# Patient Record
Sex: MALE | Race: WHITE | NOT HISPANIC OR LATINO | URBAN - METROPOLITAN AREA
[De-identification: names, ages, dates, MRNs, and addresses within clinical notes are randomized per-mention and may not be internally consistent; named-entity substitution may affect disease eponyms.]

---

## 2023-11-19 PROBLEM — F31.9 BIPOLAR 1 DISORDER (H): Status: ACTIVE | Noted: 2023-11-19

## 2023-11-19 NOTE — PROGRESS NOTES
"SUBJECTIVE:   Riaz is a 28 year old, presenting for the following:  Establish Care and Physical      Healthy Habits:     Getting at least 3 servings of Calcium per day:  Yes    Bi-annual eye exam:  Yes    Dental care twice a year:  NO    Sleep apnea or symptoms of sleep apnea:  Daytime drowsiness    Diet:  Regular (no restrictions)    Frequency of exercise:  4-5 days/week    Duration of exercise:  45-60 minutes    Taking medications regularly:  Yes    Medication side effects:  Lightheadedness    Additional concerns today:  No    - walks almost every day, 40-60 minutes at a time  - heart rate up a little with walks  - yoga twice a week      # Bipolar 1  # SHAHRZAD  - BP diagnosed 2019 in the setting of an acute psychotic episode requiring hospitalization  - recently mood symptoms have been worse, more \"chaotic\" recently  - has been working at lot on their [employer] recently, used the word \"manically\" to describe this  - job hunting    Psychiatry: Mary Nelson with [facility] (for years)  Therapy: Cathie Russell with [facility] (since early 2023)    Current Regimen  Buspirone 5mg AM and 10mg HS (started 2022)  Lithium CR 1125mg daily (started 2019)    Hydroxyzine 25mg PRN - rare and caused migraines         No data to display              # Palpitations  - sometimes at rest will feel that pulse is higher than it should be  - maybe once or twice a week  - can last a few hours  - feels \"tight\" in their chest and more anxious during these episodes  - they notice these other symptoms before the higher heart rate    - has felt his for a couple years, worse the past 2 years  - has no syncope history    # Nose Irritation  - right nostril  - a few months  - congested on that side    - had a sore spot just inside nostril for several weeks    - will have periods of very frequent sneezing  - used to happen infrequently  - now happening twice a week    # STI Screening    # HM  10/20/22 FChol 145, TGY 81, HDL 43, non-, LDL 86, VLDL " "16 @ [facility] [place] [facility]    Social History     Tobacco Use    Smoking status: Never    Smokeless tobacco: Never   Substance Use Topics    Alcohol use: Not Currently             11/20/2023    10:48 AM   Alcohol Use   Prescreen: >3 drinks/day or >7 drinks/week? Not Applicable     Last PSA: No results found for: \"PSA\"    Reviewed orders with patient. Reviewed health maintenance and updated orders accordingly - Yes    Reviewed and updated as needed this visit by clinical staff   Tobacco  Allergies  Meds   Med Hx  Surg Hx  Fam Hx  Soc Hx        Reviewed and updated as needed this visit by Provider   Tobacco  Allergies  Meds   Med Hx  Surg Hx  Fam Hx  Soc Hx         Review of Systems   Constitutional:  Negative for chills and fever.   HENT:  Positive for congestion. Negative for ear pain, hearing loss and sore throat.    Eyes:  Negative for pain and visual disturbance.   Respiratory:  Negative for cough and shortness of breath.    Cardiovascular:  Negative for chest pain, palpitations and peripheral edema.   Gastrointestinal:  Negative for abdominal pain, constipation, diarrhea, heartburn, hematochezia and nausea.   Genitourinary:  Positive for frequency. Negative for dysuria, genital sores, hematuria, impotence, penile discharge and urgency.   Musculoskeletal:  Positive for arthralgias and myalgias. Negative for joint swelling.   Skin:  Negative for rash.   Neurological:  Positive for headaches. Negative for dizziness, weakness and paresthesias.   Psychiatric/Behavioral:  Positive for mood changes. The patient is nervous/anxious.        OBJECTIVE:   BP (!) 149/93   Pulse 70   Temp 97.8  F (36.6  C)   Ht 1.796 m (5' 10.71\")   Wt 80.3 kg (177 lb 0.6 oz)   SpO2 98%   BMI 24.90 kg/m      Physical Exam  GENERAL: healthy, alert and no distress  EYES: Eyes grossly normal to inspection, PERRL and conjunctivae and sclerae normal  HENT: ear canals and TM's normal, nose and mouth without ulcers or lesions  NECK: no adenopathy, " no asymmetry, masses, or scars and thyroid normal to palpation  RESP: lungs clear to auscultation - no rales, rhonchi or wheezes  CV: regular rate and rhythm, normal S1 S2, no S3 or S4, no murmur, click or rub, no peripheral edema and peripheral pulses strong  ABDOMEN: soft, nontender, no hepatosplenomegaly, no masses and bowel sounds normal  MS: no gross musculoskeletal defects noted, no edema  SKIN: no suspicious lesions or rashes  NEURO: Normal strength and tone, mentation intact and speech normal  PSYCH: mentation appears normal, affect normal/bright         EKG Interpretation:      Symptoms at time of EKG: None   Rhythm: Normal sinus   Rate: Bradycardia, 49 bpm  Axis: Normal  Ectopy: None on final EKG, rare PVCs seen on EKG monitor  Conduction: Normal  ST Segments/ T Waves: ST Segement Elevation V1-V5 with asymmetric, prominent T waves and J point notching, consistent with benign early repolarization.   Q Waves: None  Comparison to prior: No old EKG available    Clinical Impression: Sinus Bradycardia      ASSESSMENT/PLAN:     (Z00.00) Annual physical exam  (primary encounter diagnosis)  Comment: Age appropriate screening and preventive services provided.   Plan: Last TDaP in 2017 in duplicate chart - will get charts merged.  Thinks they've had the Hep B vaccine - will talk with mom abut records.     (F31.9) Bipolar 1 disorder (H)  (F41.1) SHAHRZAD (generalized anxiety disorder)  Comment: Chronic, stable. [place] [facility]   Plan: TSH, T4 free, Basic metabolic panel    (R00.2) Palpitations  Comment: Chronic, stable.  Likely anxiety related but updating thyroid monitoring for lithium, hemoglobin, and electrolytes. EKG today normal.  If Patty develops any worsening symptoms (especially pre-syncopal or syncope) or more frequent symptoms, I asked them to reach out and we'd plan for ambulatory heart monitoring.   Plan: TSH, T4 free, Hemoglobin, Basic metabolic         panel, EKG 12-lead complete w/read -  Clinics          (Z11.3) Routine screening for STI (sexually transmitted infection)  Comment: Plan: Chlamydia trachomatis/Neisseria gonorrhoeae by         PCR, Treponema Abs w Reflex to RPR and Titer          (Z11.4) Screening for HIV (human immunodeficiency virus)  Comment: Plan: HIV Antigen Antibody Combo Cascade          (Z11.59) Encounter for hepatitis C screening test for low risk patient  Comment: Plan: Hepatitis C Screen Reflex to HCV RNA Quant and         Genotype          (R09.81) Chronic nasal congestion  Comment: Chronic, stable.  Trial of nasal fluticasone.  If not improving, will refer to ENT.  Plan: fluticasone (FLONASE) 50 MCG/ACT nasal spray            COUNSELING:   Reviewed preventive health counseling, as reflected in patient instructions      He reports that Patty Lamas has never smoked. Patty Lamas has never used smokeless tobacco.      MD JUNG Felton [facility]

## 2023-11-20 ENCOUNTER — DOCUMENTATION ONLY (OUTPATIENT)
Dept: FAMILY MEDICINE | Facility: CLINIC | Age: 28
End: 2023-11-20

## 2023-11-20 ENCOUNTER — OFFICE VISIT (OUTPATIENT)
Dept: FAMILY MEDICINE | Facility: CLINIC | Age: 28
End: 2023-11-20
Payer: COMMERCIAL

## 2023-11-20 VITALS
SYSTOLIC BLOOD PRESSURE: 149 MMHG | OXYGEN SATURATION: 98 % | WEIGHT: 177.04 LBS | BODY MASS INDEX: 24.78 KG/M2 | TEMPERATURE: 97.8 F | DIASTOLIC BLOOD PRESSURE: 93 MMHG | HEART RATE: 70 BPM | HEIGHT: 71 IN

## 2023-11-20 DIAGNOSIS — F41.1 GAD (GENERALIZED ANXIETY DISORDER): ICD-10-CM

## 2023-11-20 DIAGNOSIS — Z11.4 SCREENING FOR HIV (HUMAN IMMUNODEFICIENCY VIRUS): ICD-10-CM

## 2023-11-20 DIAGNOSIS — Z00.00 ANNUAL PHYSICAL EXAM: Primary | ICD-10-CM

## 2023-11-20 DIAGNOSIS — F31.9 BIPOLAR 1 DISORDER (H): ICD-10-CM

## 2023-11-20 DIAGNOSIS — R00.2 PALPITATIONS: ICD-10-CM

## 2023-11-20 DIAGNOSIS — Z11.59 ENCOUNTER FOR HEPATITIS C SCREENING TEST FOR LOW RISK PATIENT: ICD-10-CM

## 2023-11-20 DIAGNOSIS — R09.81 CHRONIC NASAL CONGESTION: ICD-10-CM

## 2023-11-20 DIAGNOSIS — Z11.3 ROUTINE SCREENING FOR STI (SEXUALLY TRANSMITTED INFECTION): ICD-10-CM

## 2023-11-20 PROBLEM — M26.609 TMJ (TEMPOROMANDIBULAR JOINT SYNDROME): Chronic | Status: ACTIVE | Noted: 2023-11-20

## 2023-11-20 PROBLEM — M26.609 TMJ (TEMPOROMANDIBULAR JOINT SYNDROME): Status: ACTIVE | Noted: 2023-11-20

## 2023-11-20 LAB
ANION GAP SERPL CALCULATED.3IONS-SCNC: 9 MMOL/L (ref 7–15)
BUN SERPL-MCNC: 13.7 MG/DL (ref 6–20)
CALCIUM SERPL-MCNC: 10.1 MG/DL (ref 8.6–10)
CHLORIDE SERPL-SCNC: 103 MMOL/L (ref 98–107)
CREAT SERPL-MCNC: 1.03 MG/DL (ref 0.51–1.17)
DEPRECATED HCO3 PLAS-SCNC: 27 MMOL/L (ref 22–29)
EGFRCR SERPLBLD CKD-EPI 2021: >90 ML/MIN/1.73M2
GLUCOSE SERPL-MCNC: 86 MG/DL (ref 70–99)
HGB BLD-MCNC: 15.3 G/DL (ref 11.7–17.7)
POTASSIUM SERPL-SCNC: 4.4 MMOL/L (ref 3.4–5.3)
SODIUM SERPL-SCNC: 139 MMOL/L (ref 135–145)
T4 FREE SERPL-MCNC: 1.4 NG/DL (ref 0.9–1.7)
TSH SERPL DL<=0.005 MIU/L-ACNC: 1.83 UIU/ML (ref 0.3–4.2)

## 2023-11-20 PROCEDURE — 87389 HIV-1 AG W/HIV-1&-2 AB AG IA: CPT | Mod: ORL | Performed by: FAMILY MEDICINE

## 2023-11-20 PROCEDURE — 84443 ASSAY THYROID STIM HORMONE: CPT | Mod: ORL | Performed by: FAMILY MEDICINE

## 2023-11-20 PROCEDURE — 86803 HEPATITIS C AB TEST: CPT | Mod: ORL | Performed by: FAMILY MEDICINE

## 2023-11-20 PROCEDURE — 87491 CHLMYD TRACH DNA AMP PROBE: CPT | Mod: ORL | Performed by: FAMILY MEDICINE

## 2023-11-20 PROCEDURE — 80048 BASIC METABOLIC PNL TOTAL CA: CPT | Mod: ORL | Performed by: FAMILY MEDICINE

## 2023-11-20 PROCEDURE — 84439 ASSAY OF FREE THYROXINE: CPT | Mod: ORL | Performed by: FAMILY MEDICINE

## 2023-11-20 PROCEDURE — 86780 TREPONEMA PALLIDUM: CPT | Mod: ORL | Performed by: FAMILY MEDICINE

## 2023-11-20 RX ORDER — LITHIUM CARBONATE 450 MG
1125 TABLET, EXTENDED RELEASE ORAL DAILY
COMMUNITY
Start: 2023-10-26

## 2023-11-20 RX ORDER — FLUTICASONE PROPIONATE 50 MCG
1 SPRAY, SUSPENSION (ML) NASAL DAILY
Qty: 15.8 ML | Refills: 1 | Status: SHIPPED | OUTPATIENT
Start: 2023-11-20 | End: 2024-03-22

## 2023-11-20 RX ORDER — BUSPIRONE HYDROCHLORIDE 10 MG/1
15 TABLET ORAL DAILY
COMMUNITY
Start: 2023-11-07

## 2023-11-20 RX ORDER — HYDROXYZINE HYDROCHLORIDE 25 MG/1
25 TABLET, FILM COATED ORAL EVERY 4 HOURS PRN
COMMUNITY
Start: 2023-10-04 | End: 2023-11-20 | Stop reason: SINTOL

## 2023-11-20 ASSESSMENT — ENCOUNTER SYMPTOMS
NAUSEA: 0
MYALGIAS: 1
FEVER: 0
SORE THROAT: 0
CONSTIPATION: 0
DIZZINESS: 0
JOINT SWELLING: 0
ABDOMINAL PAIN: 0
WEAKNESS: 0
DYSURIA: 0
COUGH: 0
SHORTNESS OF BREATH: 0
NERVOUS/ANXIOUS: 1
EYE PAIN: 0
DIARRHEA: 0
CHILLS: 0
HEMATURIA: 0
PALPITATIONS: 0
FREQUENCY: 1
HEMATOCHEZIA: 0
HEARTBURN: 0
HEADACHES: 1
ARTHRALGIAS: 1
PARESTHESIAS: 0

## 2023-11-20 ASSESSMENT — ANXIETY QUESTIONNAIRES
6. BECOMING EASILY ANNOYED OR IRRITABLE: MORE THAN HALF THE DAYS
7. FEELING AFRAID AS IF SOMETHING AWFUL MIGHT HAPPEN: MORE THAN HALF THE DAYS
GAD7 TOTAL SCORE: 13
1. FEELING NERVOUS, ANXIOUS, OR ON EDGE: NEARLY EVERY DAY
5. BEING SO RESTLESS THAT IT IS HARD TO SIT STILL: SEVERAL DAYS
3. WORRYING TOO MUCH ABOUT DIFFERENT THINGS: MORE THAN HALF THE DAYS
IF YOU CHECKED OFF ANY PROBLEMS ON THIS QUESTIONNAIRE, HOW DIFFICULT HAVE THESE PROBLEMS MADE IT FOR YOU TO DO YOUR WORK, TAKE CARE OF THINGS AT HOME, OR GET ALONG WITH OTHER PEOPLE: SOMEWHAT DIFFICULT
2. NOT BEING ABLE TO STOP OR CONTROL WORRYING: MORE THAN HALF THE DAYS
GAD7 TOTAL SCORE: 13

## 2023-11-20 ASSESSMENT — PATIENT HEALTH QUESTIONNAIRE - PHQ9
SUM OF ALL RESPONSES TO PHQ QUESTIONS 1-9: 10
5. POOR APPETITE OR OVEREATING: SEVERAL DAYS

## 2023-11-20 NOTE — PATIENT INSTRUCTIONS
1) Talk with insurance about dental exam coverage frequency    2) Talk with mom about trying to find records of your hepatitis B vaccination    3) Try Flonase for 2 weeks. Let me know if not improving and I will send you to ENT

## 2023-11-20 NOTE — NURSING NOTE
"28 year old  Chief Complaint   Patient presents with    [facility]    Physical       Blood pressure (!) 149/93, pulse 70, temperature 97.8  F (36.6  C), height 1.796 m (5' 10.71\"), weight 80.3 kg (177 lb 0.6 oz), SpO2 98%. Body mass index is 24.9 kg/m .  Patient Active Problem List   Diagnosis    Bipolar 1 disorder (H)       Wt Readings from Last 2 Encounters:   11/20/23 80.3 kg (177 lb 0.6 oz)     BP Readings from Last 3 Encounters:   11/20/23 (!) 149/93         Current Outpatient Medications   Medication    busPIRone (BUSPAR) 10 MG tablet    hydrOXYzine (ATARAX) 25 MG tablet    lithium (ESKALITH CR/LITHOBID) 450 MG CR tablet     No current facility-administered medications for this visit.       Social History     Tobacco Use    Smoking status: Never    Smokeless tobacco: Never   Substance Use Topics    Alcohol use: Not Currently    Drug use: Never       Health Maintenance Due   Topic Date Due    YEARLY PREVENTIVE VISIT  Never done    ADVANCE CARE PLANNING  Never done    HEPATITIS B IMMUNIZATION (1 of 3 - 3-dose series) Never done    HIV SCREENING  Never done    HEPATITIS C SCREENING  Never done    DTAP/TDAP/TD IMMUNIZATION (1 - Tdap) Never done    PHQ-2 (once per calendar year)  Never done       No results found for: \"PAP\"      November 20, 2023 10:54 AM    "

## 2023-11-21 DIAGNOSIS — E83.52 HYPERCALCEMIA: Primary | ICD-10-CM

## 2023-11-21 LAB
C TRACH DNA SPEC QL PROBE+SIG AMP: NEGATIVE
HCV AB SERPL QL IA: NONREACTIVE
HIV 1+2 AB+HIV1 P24 AG SERPL QL IA: NONREACTIVE
N GONORRHOEA DNA SPEC QL NAA+PROBE: NEGATIVE
T PALLIDUM AB SER QL: NONREACTIVE

## 2024-03-22 DIAGNOSIS — R09.81 CHRONIC NASAL CONGESTION: ICD-10-CM

## 2024-03-22 RX ORDER — FLUTICASONE PROPIONATE 50 MCG
1 SPRAY, SUSPENSION (ML) NASAL DAILY
Qty: 16 G | Refills: 2 | Status: SHIPPED | OUTPATIENT
Start: 2024-03-22

## 2024-03-22 NOTE — TELEPHONE ENCOUNTER
Fluticasone (Flonase) 50 MCG/ACT nasal spray    Last Office Visit: 11/20/23  [facility] Appointments: None  Medication last refilled: 11/2023 #15.8 ml with 1 refill(s)    Prescription approved per [facility] Refill Protocol.    KEN FallonN, RN, CCM

## 2024-07-26 ENCOUNTER — TELEPHONE (OUTPATIENT)
Dept: FAMILY MEDICINE | Facility: CLINIC | Age: 29
End: 2024-07-26

## 2024-07-26 NOTE — TELEPHONE ENCOUNTER
"Pt called to report \"burning sore pain over the top of my right knee for one month now and is getting progressively worse.\" Interfering with work. Pain and stiffness while ambulating. No lesions or visible signs of injury.  Recommended walk-in clinic at [facility], [facility] or [facility].    ELLA Storey, RN  07/26/24, 10:12 AM    "

## 2025-01-12 ENCOUNTER — HEALTH MAINTENANCE LETTER (OUTPATIENT)
Age: 30
End: 2025-01-12

## 2025-02-12 SDOH — HEALTH STABILITY: PHYSICAL HEALTH: ON AVERAGE, HOW MANY MINUTES DO YOU ENGAGE IN EXERCISE AT THIS LEVEL?: 50 MIN

## 2025-02-12 SDOH — HEALTH STABILITY: PHYSICAL HEALTH: ON AVERAGE, HOW MANY DAYS PER WEEK DO YOU ENGAGE IN MODERATE TO STRENUOUS EXERCISE (LIKE A BRISK WALK)?: 3 DAYS

## 2025-02-12 ASSESSMENT — SOCIAL DETERMINANTS OF HEALTH (SDOH): HOW OFTEN DO YOU GET TOGETHER WITH FRIENDS OR RELATIVES?: THREE TIMES A WEEK

## 2025-02-17 ENCOUNTER — OFFICE VISIT (OUTPATIENT)
Dept: FAMILY MEDICINE | Facility: CLINIC | Age: 30
End: 2025-02-17
Payer: COMMERCIAL

## 2025-02-17 VITALS
HEIGHT: 71 IN | WEIGHT: 188.5 LBS | RESPIRATION RATE: 16 BRPM | BODY MASS INDEX: 26.39 KG/M2 | DIASTOLIC BLOOD PRESSURE: 88 MMHG | SYSTOLIC BLOOD PRESSURE: 134 MMHG | TEMPERATURE: 97.8 F | OXYGEN SATURATION: 98 % | HEART RATE: 68 BPM

## 2025-02-17 DIAGNOSIS — M79.641 PAIN OF RIGHT HAND: ICD-10-CM

## 2025-02-17 DIAGNOSIS — R06.7 SNEEZING: ICD-10-CM

## 2025-02-17 DIAGNOSIS — B07.0 PLANTAR WARTS: ICD-10-CM

## 2025-02-17 DIAGNOSIS — R19.8 IRREGULAR BOWEL HABITS: ICD-10-CM

## 2025-02-17 DIAGNOSIS — Z00.00 WELLNESS EXAMINATION: Primary | ICD-10-CM

## 2025-02-17 RX ORDER — PROPRANOLOL HCL 10 MG
10 TABLET ORAL 3 TIMES DAILY PRN
COMMUNITY

## 2025-02-17 ASSESSMENT — ANXIETY QUESTIONNAIRES
3. WORRYING TOO MUCH ABOUT DIFFERENT THINGS: MORE THAN HALF THE DAYS
7. FEELING AFRAID AS IF SOMETHING AWFUL MIGHT HAPPEN: NOT AT ALL
5. BEING SO RESTLESS THAT IT IS HARD TO SIT STILL: SEVERAL DAYS
1. FEELING NERVOUS, ANXIOUS, OR ON EDGE: MORE THAN HALF THE DAYS
8. IF YOU CHECKED OFF ANY PROBLEMS, HOW DIFFICULT HAVE THESE MADE IT FOR YOU TO DO YOUR WORK, TAKE CARE OF THINGS AT HOME, OR GET ALONG WITH OTHER PEOPLE?: NOT DIFFICULT AT ALL
2. NOT BEING ABLE TO STOP OR CONTROL WORRYING: SEVERAL DAYS
GAD7 TOTAL SCORE: 8
GAD7 TOTAL SCORE: 8
7. FEELING AFRAID AS IF SOMETHING AWFUL MIGHT HAPPEN: NOT AT ALL
4. TROUBLE RELAXING: SEVERAL DAYS
IF YOU CHECKED OFF ANY PROBLEMS ON THIS QUESTIONNAIRE, HOW DIFFICULT HAVE THESE PROBLEMS MADE IT FOR YOU TO DO YOUR WORK, TAKE CARE OF THINGS AT HOME, OR GET ALONG WITH OTHER PEOPLE: NOT DIFFICULT AT ALL
6. BECOMING EASILY ANNOYED OR IRRITABLE: SEVERAL DAYS
GAD7 TOTAL SCORE: 8

## 2025-02-17 ASSESSMENT — PATIENT HEALTH QUESTIONNAIRE - PHQ9
SUM OF ALL RESPONSES TO PHQ QUESTIONS 1-9: 2
SUM OF ALL RESPONSES TO PHQ QUESTIONS 1-9: 2
10. IF YOU CHECKED OFF ANY PROBLEMS, HOW DIFFICULT HAVE THESE PROBLEMS MADE IT FOR YOU TO DO YOUR WORK, TAKE CARE OF THINGS AT HOME, OR GET ALONG WITH OTHER PEOPLE: NOT DIFFICULT AT ALL

## 2025-02-17 NOTE — NURSING NOTE
"Patty  30 year old    Chief Complaint   Patient presents with    Physical            Blood pressure (!) 156/78, pulse 68, temperature 97.8  F (36.6  C), temperature source Skin, resp. rate 16, height 1.806 m (5' 11.1\"), weight 85.5 kg (188 lb 8 oz), SpO2 98%. Body mass index is 26.22 kg/m .    Patient Active Problem List   Diagnosis    Bipolar 1 disorder (H)    SHAHRZAD (generalized anxiety disorder)    TMJ (temporomandibular joint syndrome)              Wt Readings from Last 2 Encounters:   02/17/25 85.5 kg (188 lb 8 oz)   11/20/23 80.3 kg (177 lb 0.6 oz)       BP Readings from Last 3 Encounters:   02/17/25 (!) 156/78   11/20/23 (!) 149/93                Current Outpatient Medications   Medication Sig Dispense Refill    busPIRone (BUSPAR) 10 MG tablet Take 15 mg by mouth daily Take 5mg in the morning and 10mg at bedtime.      fluticasone (FLONASE) 50 MCG/ACT nasal spray SHAKE LIQUID AND USE 1 SPRAY IN EACH NOSTRIL DAILY 16 g 2    lithium (ESKALITH CR/LITHOBID) 450 MG CR tablet Take 900 mg by mouth daily.      propranolol (INDERAL) 5 mg half-tab Take 10 mg by mouth 3 times daily as needed.       No current facility-administered medications for this visit.              Social History     Tobacco Use    Smoking status: Never    Smokeless tobacco: Never   Substance Use Topics    Alcohol use: Not Currently    Drug use: Not Currently              Health Maintenance Due   Topic Date Due    ADVANCE CARE PLANNING  Never done    HEPATITIS B IMMUNIZATION (2 of 2 - CpG 2-dose series) 07/10/2024    YEARLY PREVENTIVE VISIT  11/20/2024            No results found for: \"PAP\"           February 17, 2025 8:05 AM  "

## 2025-02-23 ENCOUNTER — MYC MEDICAL ADVICE (OUTPATIENT)
Dept: FAMILY MEDICINE | Facility: CLINIC | Age: 30
End: 2025-02-23

## 2025-02-23 DIAGNOSIS — R09.81 CHRONIC NASAL CONGESTION: ICD-10-CM

## 2025-02-24 RX ORDER — FLUTICASONE PROPIONATE 50 MCG
1 SPRAY, SUSPENSION (ML) NASAL DAILY
Qty: 16 G | Refills: 2 | Status: SHIPPED | OUTPATIENT
Start: 2025-02-24

## 2025-02-24 RX ORDER — FLUTICASONE PROPIONATE 50 MCG
1 SPRAY, SUSPENSION (ML) NASAL DAILY
Qty: 16 G | Refills: 2 | Status: SHIPPED | OUTPATIENT
Start: 2025-02-24 | End: 2025-02-24

## 2025-02-24 NOTE — TELEPHONE ENCOUNTER
Medication requested: fluticasone (FLONASE) 50 MCG/ACT nasal spray   Last office visit: 2/17/25  [facility] appointments: none  Medication last refilled: 3/22/24; 16 g + 2 refills  Last qualifying labs: N/A    Prescription approved per [facility] Refill Protocol.    Yonathan JARAMILLO, RN  02/24/25 10:06 AM

## 2025-06-27 NOTE — PROGRESS NOTES
"  Assessment & Plan   Problem List Items Addressed This Visit    None  Visit Diagnoses         Skin change    -  Primary          Mole on back appears benign. Spot on hand appears to be a possible foreign body making its way out. Reassured patient that I have low suspicion for either of these to be more sinister, but I have provided contact information for [facility] and [facility] should patient decide they would like to meet with a specialist or consider possible ultrasound to further assess for possible foreign body in LEFT hand.      26 minutes spent on the date of the encounter doing chart review, history and exam, documentation and further activities as noted.       Kar Sharma MD  8:30 AM, June 30, 2025      Stefania Brambila is a 30 year old, presenting for the following health issues:  Derm Problem (Pt has a possible mole on their stomach and would like to have it looked at. )    HPI    Skin Check  - mole on abdomen, another spot on LEFT hand  - abdomen mole \"been there forever\", but earlier this year it cracked open and drained some  - they do shaver their chest and abdomen    - spot on hand  - will develop induration around the spot, then resolve  - doesn't remember any foreign object  - minimal tenderness sometimes    Review of Systems  Constitutional, HEENT, cardiovascular, pulmonary, gi and gu systems are negative, except as otherwise noted.      Objective    /89   Pulse 60   Temp 98  F (36.7  C)   Ht 1.806 m (5' 11.1\")   Wt 86.2 kg (190 lb 1.9 oz)   SpO2 100%   BMI 26.44 kg/m    Body mass index is 26.44 kg/m .    Physical Exam   GENERAL: alert and no distress  NECK: no adenopathy, no asymmetry, masses, or scars  RESP: lungs clear to auscultation - no rales, rhonchi or wheezes  CV: regular rate and rhythm, normal S1 S2, no S3 or S4, no murmur, click or rub, no peripheral edema  ABDOMEN: soft, nontender, no hepatosplenomegaly, no masses and bowel sounds normal  MS: no gross " musculoskeletal defects noted, no edema  SKIN: no suspicious lesions or rashes          Signed Electronically by: Kar Sharma MD

## 2025-06-30 ENCOUNTER — OFFICE VISIT (OUTPATIENT)
Dept: FAMILY MEDICINE | Facility: CLINIC | Age: 30
End: 2025-06-30
Payer: COMMERCIAL

## 2025-06-30 VITALS
OXYGEN SATURATION: 100 % | BODY MASS INDEX: 26.62 KG/M2 | TEMPERATURE: 98 F | SYSTOLIC BLOOD PRESSURE: 135 MMHG | HEIGHT: 71 IN | DIASTOLIC BLOOD PRESSURE: 89 MMHG | WEIGHT: 190.12 LBS | HEART RATE: 60 BPM

## 2025-06-30 DIAGNOSIS — R23.9 SKIN CHANGE: Primary | ICD-10-CM

## 2025-08-11 DIAGNOSIS — R09.81 CHRONIC NASAL CONGESTION: ICD-10-CM

## 2025-08-13 RX ORDER — FLUTICASONE PROPIONATE 50 MCG
1 SPRAY, SUSPENSION (ML) NASAL DAILY
Qty: 16 G | Refills: 2 | Status: SHIPPED | OUTPATIENT
Start: 2025-08-13

## 2025-09-02 ENCOUNTER — OFFICE VISIT (OUTPATIENT)
Dept: URGENT CARE | Facility: URGENT CARE | Age: 30
End: 2025-09-02
Payer: COMMERCIAL

## 2025-09-02 VITALS
DIASTOLIC BLOOD PRESSURE: 87 MMHG | OXYGEN SATURATION: 99 % | WEIGHT: 192 LBS | HEART RATE: 61 BPM | BODY MASS INDEX: 26.88 KG/M2 | TEMPERATURE: 98.2 F | RESPIRATION RATE: 18 BRPM | HEIGHT: 71 IN | SYSTOLIC BLOOD PRESSURE: 132 MMHG

## 2025-09-02 DIAGNOSIS — H60.391 INFECTIVE OTITIS EXTERNA, RIGHT: Primary | ICD-10-CM

## 2025-09-02 PROCEDURE — 3075F SYST BP GE 130 - 139MM HG: CPT | Performed by: NURSE PRACTITIONER

## 2025-09-02 PROCEDURE — 3079F DIAST BP 80-89 MM HG: CPT | Performed by: NURSE PRACTITIONER

## 2025-09-02 PROCEDURE — 99213 OFFICE O/P EST LOW 20 MIN: CPT | Performed by: NURSE PRACTITIONER

## 2025-09-02 PROCEDURE — 1125F AMNT PAIN NOTED PAIN PRSNT: CPT | Performed by: NURSE PRACTITIONER

## 2025-09-02 RX ORDER — NEOMYCIN SULFATE, POLYMYXIN B SULFATE AND HYDROCORTISONE 10; 3.5; 1 MG/ML; MG/ML; [USP'U]/ML
4 SUSPENSION/ DROPS AURICULAR (OTIC) 4 TIMES DAILY
Qty: 10 ML | Refills: 0 | Status: SHIPPED | OUTPATIENT
Start: 2025-09-02 | End: 2025-09-09

## 2025-09-02 ASSESSMENT — PAIN SCALES - GENERAL: PAINLEVEL_OUTOF10: MILD PAIN (3)
